# Patient Record
Sex: FEMALE | Race: WHITE | NOT HISPANIC OR LATINO | Employment: UNEMPLOYED | URBAN - METROPOLITAN AREA
[De-identification: names, ages, dates, MRNs, and addresses within clinical notes are randomized per-mention and may not be internally consistent; named-entity substitution may affect disease eponyms.]

---

## 2023-06-23 ENCOUNTER — HOSPITAL ENCOUNTER (EMERGENCY)
Facility: HOSPITAL | Age: 38
Discharge: HOME | End: 2023-06-23
Attending: EMERGENCY MEDICINE
Payer: OTHER GOVERNMENT

## 2023-06-23 VITALS
RESPIRATION RATE: 18 BRPM | OXYGEN SATURATION: 98 % | HEART RATE: 106 BPM | TEMPERATURE: 97.3 F | DIASTOLIC BLOOD PRESSURE: 86 MMHG | SYSTOLIC BLOOD PRESSURE: 140 MMHG

## 2023-06-23 DIAGNOSIS — S51.812A FOREARM LACERATION, LEFT, INITIAL ENCOUNTER: Primary | ICD-10-CM

## 2023-06-23 PROCEDURE — 90471 IMMUNIZATION ADMIN: CPT

## 2023-06-23 PROCEDURE — 90715 TDAP VACCINE 7 YRS/> IM: CPT

## 2023-06-23 PROCEDURE — 99282 EMERGENCY DEPT VISIT SF MDM: CPT

## 2023-06-23 RX ORDER — GINSENG 100 MG
1 CAPSULE ORAL ONCE
Status: COMPLETED | OUTPATIENT
Start: 2023-06-23 | End: 2023-06-23

## 2023-06-23 RX ORDER — LIDOCAINE HYDROCHLORIDE AND EPINEPHRINE 10; 10 MG/ML; UG/ML
5 INJECTION, SOLUTION INFILTRATION; PERINEURAL ONCE
Status: COMPLETED | OUTPATIENT
Start: 2023-06-23 | End: 2023-06-23

## 2023-06-23 RX ADMIN — TETANUS TOXOID, REDUCED DIPHTHERIA TOXOID AND ACELLULAR PERTUSSIS VACCINE, ADSORBED 0.5 ML: 5; 2.5; 8; 8; 2.5 SUSPENSION INTRAMUSCULAR at 22:01

## 2023-06-23 RX ADMIN — BACITRACIN ZINC 1 LARGE APPLICATION: 500 OINTMENT TOPICAL at 22:29

## 2023-06-23 RX ADMIN — LIDOCAINE HYDROCHLORIDE,EPINEPHRINE BITARTRATE 5 ML: 10; .01 INJECTION, SOLUTION INFILTRATION; PERINEURAL at 22:01

## 2023-06-24 NOTE — ED ATTENDING ATTESTATION
6/23/2023  IWendy MD, saw and evaluated the patient  I have discussed the patient with the resident/non-physician practitioner and agree with the resident's/non-physician practitioner's findings, Plan of Care, and MDM as documented in the resident's/non-physician practitioner's note, except where noted  All available labs and Radiology studies were reviewed  I was present for key portions of any procedure(s) performed by the resident/non-physician practitioner and I was immediately available to provide assistance  At this point I agree with the current assessment done in the Emergency Department  I have conducted an independent evaluation of this patient a history and physical is as follows:    52-year-old right-hand-dominant female presenting with left forearm laceration  Patient was loading the  when one of the glasses broke while patient was placing dishes in the , cutting her left dorsal forearm  Patient applied pressure to stop the bleeding  There is no weakness or numbness distally to the injury  Patient is not up-to-date on her tetanus  No other injuries  /86 (BP Location: Right arm)   Pulse (!) 106   Temp (!) 97 3 °F (36 3 °C) (Temporal)   Resp 18   SpO2 98%     Constitutional:  Awake, alert, oriented  No acute distress  HEENT:  Normocephalic, atraumatic  Sclera anicteric, conjunctiva not injected  Moist oral mucosa  Cardiac:  Appears well-perfused  Respiratory:  Breathing comfortably on room air  Abdomen:  Nondistended  Extremities:   Examination of left forearm reveals a 6 cm full-thickness laceration down to subcutaneous tissue without obvious foreign bodies, without violation of fascia  Bleeding is controlled  Sensation is intact throughout cutaneous nerve distributions of the hand and patient is able to flex and extend at the wrist, as well as fingers  2+ radial pulse    Integument:  No rashes over exposed areas, cap refill less than 2 seconds  Neurologic:  Awake, alert, and oriented x3  Nonfocal exam   Psychiatric:  Normal affect        ED Course     Medications   tetanus-diphtheria-acellular pertussis (BOOSTRIX) IM injection 0 5 mL (0 5 mL Intramuscular Given 6/23/23 2201)   lidocaine-epinephrine (XYLOCAINE/EPINEPHRINE) 1 %-1:100,000 injection 5 mL (5 mL Infiltration Given by Other 6/23/23 2201)   bacitracin topical ointment 1 large application (1 large application Topical Given 6/23/23 259)     81-year-old female presenting with laceration of left dorsal forearm  Vital signs reviewed, hypertensive, mildly tachycardic, within normal limits otherwise  Tetanus updated  Laceration repaired by resident physician, FREDY was available for key portions of the procedure  Patient tolerated the procedure well  Recommend suture removal in 7 days    Patient discharged home with recommendations for symptom control, return precautions, and plan for follow-up for suture removal

## 2023-06-24 NOTE — DISCHARGE INSTRUCTIONS
Sutures need to be removed in 7-10 days  Monitor for signs of infection including worsened pain, redness, discharge from wound -- if concerned please return  Keep site clean and dry, avoid scrubbing sutures  Once sutures are removed, consider using sunscreen to help with scarring  If you develop new or worsening symptoms, please return to the Emergency Department for further evaluation

## 2023-06-24 NOTE — ED PROVIDER NOTES
History  Chief Complaint   Patient presents with   • Extremity Laceration     Lac on left forearm  Approx 1 in long  No numbness, tingling  HPI     Patient is a 42 y/o F presenting with laceration to left forearm  Patient states she was drinking wine with friends, went to place wine glass in  when the glass broke, she went to reach for glass and accidentally cut her left forearm  Moderate bleeding and pain associated  Able to move hand, sensation normal  Unsure of last tetanus  Denies intentional self injury  None       No past medical history on file  No past surgical history on file  No family history on file  I have reviewed and agree with the history as documented  No existing history information found  No existing history information found  Review of Systems   Constitutional: Negative for chills and fever  HENT: Negative for ear pain and sore throat  Eyes: Negative for pain and visual disturbance  Respiratory: Negative for cough and shortness of breath  Cardiovascular: Negative for chest pain and palpitations  Gastrointestinal: Negative for abdominal pain and vomiting  Genitourinary: Negative for dysuria and hematuria  Musculoskeletal: Negative for arthralgias and back pain  Skin: Positive for wound  Negative for color change and rash  Neurological: Negative for seizures and syncope  All other systems reviewed and are negative  Physical Exam  ED Triage Vitals [06/23/23 2116]   Temperature Pulse Respirations Blood Pressure SpO2   (!) 97 3 °F (36 3 °C) (!) 106 18 140/86 98 %      Temp Source Heart Rate Source Patient Position - Orthostatic VS BP Location FiO2 (%)   Temporal Monitor Sitting Right arm --      Pain Score       --             Orthostatic Vital Signs  Vitals:    06/23/23 2116   BP: 140/86   Pulse: (!) 106   Patient Position - Orthostatic VS: Sitting       Physical Exam  Vitals and nursing note reviewed     Constitutional:       General: She is not in acute distress  HENT:      Head: Normocephalic and atraumatic  Right Ear: External ear normal       Left Ear: External ear normal       Nose: Nose normal       Mouth/Throat:      Pharynx: Oropharynx is clear  Eyes:      Extraocular Movements: Extraocular movements intact  Pupils: Pupils are equal, round, and reactive to light  Cardiovascular:      Rate and Rhythm: Regular rhythm  Tachycardia present  Pulses: Normal pulses  Heart sounds: Normal heart sounds  No murmur heard  No friction rub  No gallop  Pulmonary:      Effort: Pulmonary effort is normal  No respiratory distress  Breath sounds: Normal breath sounds  No wheezing, rhonchi or rales  Abdominal:      General: Abdomen is flat  There is no distension  Palpations: Abdomen is soft  Tenderness: There is no abdominal tenderness  There is no guarding or rebound  Musculoskeletal:         General: No deformity  Normal range of motion  Cervical back: Normal range of motion  Right lower leg: No edema  Left lower leg: No edema  Skin:     General: Skin is warm and dry  Capillary Refill: Capillary refill takes less than 2 seconds  Findings: No rash  Comments: 6cm linear laceration to left dorsal forearm  No obvious contaminants  Minimal bleeding, no clear vascular injury  Distal extremity NVI  Neurological:      General: No focal deficit present  Mental Status: She is alert and oriented to person, place, and time        Gait: Gait normal    Psychiatric:         Mood and Affect: Mood normal          ED Medications  Medications   tetanus-diphtheria-acellular pertussis (BOOSTRIX) IM injection 0 5 mL (0 5 mL Intramuscular Given 6/23/23 2201)   lidocaine-epinephrine (XYLOCAINE/EPINEPHRINE) 1 %-1:100,000 injection 5 mL (5 mL Infiltration Given by Other 6/23/23 2201)   bacitracin topical ointment 1 large application (1 large application Topical Given 6/23/23 2229) Diagnostic Studies  Results Reviewed     None                 No orders to display         Procedures  Universal Protocol:  Timeout called at: 6/23/2023 10:04 PM   Patient understanding: patient states understanding of the procedure being performed  Patient consent: the patient's understanding of the procedure matches consent given  Procedure consent: procedure consent matches procedure scheduled  Patient identity confirmed: verbally with patient and arm band    Laceration repair    Date/Time: 6/23/2023 10:04 PM    Performed by: Alondra Patel MD  Authorized by: Alondra Patel MD  Body area: upper extremity  Location details: left lower arm  Laceration length: 6 cm  Tendon involvement: none  Nerve involvement: none  Anesthesia: local infiltration    Anesthesia:  Local Anesthetic: lidocaine 1% with epinephrine  Anesthetic total: 6 mL    Wound Dehiscence:  Superficial Wound Dehiscence: simple closure      Procedure Details:  Irrigation solution: saline  Irrigation method: jet lavage  Amount of cleaning: standard  Debridement: none  Degree of undermining: none  Skin closure: Ethilon  Number of sutures: 7  Technique: simple  Approximation: close  Dressing: gauze roll and antibiotic ointment  Patient tolerance: patient tolerated the procedure well with no immediate complications            ED Course                                       Medical Decision Making  42 y/o F presenting with acute laceration to left forearm  Laceration well approximated with suture, bacitracin applied and gauze roll applied  Return precautions discussed  Pt discharged  Risk  OTC drugs  Prescription drug management              Disposition  Final diagnoses:   Forearm laceration, left, initial encounter     Time reflects when diagnosis was documented in both MDM as applicable and the Disposition within this note     Time User Action Codes Description Comment    6/23/2023 11:10 PM Wilian Plummer Add [[id number]] Forearm laceration, left, initial encounter       ED Disposition     ED Disposition   Discharge    Condition   Stable    Date/Time   Fri Jun 23, 2023 11:10 PM    Comment   Jagdish Terry discharge to home/self care  Follow-up Information     Follow up With Specialties Details Why Contact Info Additional [address] Emergency Department Emergency Medicine In 1 week For suture removal Bleluis manuel 10 [id number] [address] Emergency Department, [address] [place] [address], [id number] [phone]          There are no discharge medications for this patient  No discharge procedures on file  PDMP Review     None           ED Provider  Attending physically available and evaluated Horaciosally Harrison DANIEL managed the patient along with the ED Attending      Electronically Signed by         Jayde Barrera MD  06/24/23 5092